# Patient Record
Sex: FEMALE | Race: WHITE | NOT HISPANIC OR LATINO
[De-identification: names, ages, dates, MRNs, and addresses within clinical notes are randomized per-mention and may not be internally consistent; named-entity substitution may affect disease eponyms.]

---

## 2017-12-01 PROBLEM — Z00.00 ENCOUNTER FOR PREVENTIVE HEALTH EXAMINATION: Status: ACTIVE | Noted: 2017-12-01

## 2018-01-05 ENCOUNTER — RECORD ABSTRACTING (OUTPATIENT)
Age: 68
End: 2018-01-05

## 2018-01-05 DIAGNOSIS — R25.2 CRAMP AND SPASM: ICD-10-CM

## 2018-01-05 DIAGNOSIS — Z78.9 OTHER SPECIFIED HEALTH STATUS: ICD-10-CM

## 2018-01-05 DIAGNOSIS — Z83.49 FAMILY HISTORY OF OTHER ENDOCRINE, NUTRITIONAL AND METABOLIC DISEASES: ICD-10-CM

## 2018-01-05 DIAGNOSIS — Z87.19 PERSONAL HISTORY OF OTHER DISEASES OF THE DIGESTIVE SYSTEM: ICD-10-CM

## 2018-01-05 DIAGNOSIS — R29.898 OTHER SYMPTOMS AND SIGNS INVOLVING THE MUSCULOSKELETAL SYSTEM: ICD-10-CM

## 2018-01-05 RX ORDER — CHROMIUM 200 MCG
TABLET ORAL
Refills: 0 | Status: ACTIVE | COMMUNITY

## 2018-01-05 RX ORDER — CALCIUM CARBONATE/VITAMIN D3 600 MG-10
TABLET ORAL
Refills: 0 | Status: ACTIVE | COMMUNITY

## 2018-03-13 ENCOUNTER — APPOINTMENT (OUTPATIENT)
Dept: CARDIOLOGY | Facility: CLINIC | Age: 68
End: 2018-03-13

## 2022-06-29 ENCOUNTER — OFFICE (OUTPATIENT)
Dept: URBAN - METROPOLITAN AREA CLINIC 12 | Facility: CLINIC | Age: 72
Setting detail: OPHTHALMOLOGY
End: 2022-06-29
Payer: MEDICARE

## 2022-06-29 DIAGNOSIS — H25.13: ICD-10-CM

## 2022-06-29 DIAGNOSIS — H40.013: ICD-10-CM

## 2022-06-29 PROCEDURE — 99204 OFFICE O/P NEW MOD 45 MIN: CPT | Performed by: OPHTHALMOLOGY

## 2022-06-29 PROCEDURE — 92250 FUNDUS PHOTOGRAPHY W/I&R: CPT | Performed by: OPHTHALMOLOGY

## 2022-06-29 PROCEDURE — 92020 GONIOSCOPY: CPT | Performed by: OPHTHALMOLOGY

## 2022-06-29 ASSESSMENT — REFRACTION_CURRENTRX
OD_AXIS: 159
OS_CYLINDER: -0.25
OS_SPHERE: +1.50
OD_CYLINDER: -0.50
OD_VPRISM_DIRECTION: SV
OS_VPRISM_DIRECTION: SV
OD_CYLINDER: SPH
OD_SPHERE: +1.25
OS_VPRISM_DIRECTION: SV
OS_CYLINDER: SPH
OS_AXIS: 146
OD_VPRISM_DIRECTION: SV
OS_OVR_VA: 20/
OD_SPHERE: +4.00
OS_SPHERE: +4.00
OD_OVR_VA: 20/
OS_OVR_VA: 20/
OD_OVR_VA: 20/

## 2022-06-29 ASSESSMENT — REFRACTION_AUTOREFRACTION
OD_SPHERE: +2.00
OS_SPHERE: +2.00
OS_AXIS: 151
OD_CYLINDER: -0.25
OD_AXIS: 138
OS_CYLINDER: -0.25

## 2022-06-29 ASSESSMENT — REFRACTION_MANIFEST
OD_VA1: 20/50-2
OS_CYLINDER: -0.25
OS_AXIS: 150
OS_SPHERE: +2.00
OD_AXIS: 140
OD_SPHERE: +2.00
OS_VA1: 20/30-2
OD_CYLINDER: -0.25

## 2022-06-29 ASSESSMENT — TONOMETRY: OD_IOP_MMHG: 19

## 2022-06-29 ASSESSMENT — SPHEQUIV_DERIVED
OS_SPHEQUIV: 1.875
OD_SPHEQUIV: 1.875
OD_SPHEQUIV: 1.875
OS_SPHEQUIV: 1.875

## 2022-06-29 ASSESSMENT — VISUAL ACUITY
OS_BCVA: 20/50+2
OD_BCVA: 20/50+2

## 2022-06-29 ASSESSMENT — CONFRONTATIONAL VISUAL FIELD TEST (CVF)
OS_FINDINGS: FULL
OD_FINDINGS: FULL

## 2022-08-15 ENCOUNTER — OFFICE (OUTPATIENT)
Dept: URBAN - METROPOLITAN AREA CLINIC 12 | Facility: CLINIC | Age: 72
Setting detail: OPHTHALMOLOGY
End: 2022-08-15
Payer: MEDICARE

## 2022-08-15 DIAGNOSIS — H25.13: ICD-10-CM

## 2022-08-15 DIAGNOSIS — H25.11: ICD-10-CM

## 2022-08-15 DIAGNOSIS — H25.12: ICD-10-CM

## 2022-08-15 DIAGNOSIS — H40.013: ICD-10-CM

## 2022-08-15 PROCEDURE — 92133 CPTRZD OPH DX IMG PST SGM ON: CPT | Performed by: OPHTHALMOLOGY

## 2022-08-15 PROCEDURE — 99214 OFFICE O/P EST MOD 30 MIN: CPT | Performed by: OPHTHALMOLOGY

## 2022-08-15 PROCEDURE — 92083 EXTENDED VISUAL FIELD XM: CPT | Performed by: OPHTHALMOLOGY

## 2022-08-15 PROCEDURE — 92136 OPHTHALMIC BIOMETRY: CPT | Performed by: OPHTHALMOLOGY

## 2022-08-15 ASSESSMENT — REFRACTION_CURRENTRX
OS_VPRISM_DIRECTION: SV
OS_CYLINDER: -0.25
OD_CYLINDER: SPH
OD_SPHERE: +1.25
OD_VPRISM_DIRECTION: SV
OS_OVR_VA: 20/
OD_CYLINDER: -0.50
OD_VPRISM_DIRECTION: SV
OS_SPHERE: +1.50
OD_OVR_VA: 20/
OD_SPHERE: +4.00
OS_SPHERE: +4.00
OD_AXIS: 159
OS_CYLINDER: SPH
OD_OVR_VA: 20/
OS_VPRISM_DIRECTION: SV
OS_OVR_VA: 20/
OS_AXIS: 146

## 2022-08-15 ASSESSMENT — REFRACTION_MANIFEST
OD_SPHERE: +2.00
OD_CYLINDER: -0.25
OS_CYLINDER: -0.25
OS_SPHERE: +2.00
OD_AXIS: 140
OD_VA1: 20/50-2
OS_AXIS: 150
OS_VA1: 20/30-2

## 2022-08-15 ASSESSMENT — SPHEQUIV_DERIVED
OD_SPHEQUIV: 1.375
OS_SPHEQUIV: 2
OD_SPHEQUIV: 1.875
OS_SPHEQUIV: 1.875

## 2022-08-15 ASSESSMENT — KERATOMETRY
OS_K2POWER_DIOPTERS: 44.00
OS_K1POWER_DIOPTERS: 43.50
OD_K2POWER_DIOPTERS: 44.25
OD_K1POWER_DIOPTERS: 43.75
OD_AXISANGLE_DEGREES: 047
OS_AXISANGLE_DEGREES: 151

## 2022-08-15 ASSESSMENT — TONOMETRY: OS_IOP_MMHG: 18

## 2022-08-15 ASSESSMENT — CONFRONTATIONAL VISUAL FIELD TEST (CVF)
OD_FINDINGS: FULL
OS_FINDINGS: FULL

## 2022-08-15 ASSESSMENT — AXIALLENGTH_DERIVED
OS_AL: 22.7519
OD_AL: 22.8938
OD_AL: 22.7118
OS_AL: 22.7973

## 2022-08-15 ASSESSMENT — VISUAL ACUITY
OS_BCVA: 20/30-2
OD_BCVA: 20/50+2

## 2022-08-15 ASSESSMENT — REFRACTION_AUTOREFRACTION
OD_CYLINDER: -0.25
OD_SPHERE: +1.50
OS_AXIS: 066
OS_CYLINDER: -0.50
OS_SPHERE: +2.25
OD_AXIS: 028

## 2022-08-25 ENCOUNTER — OFFICE (OUTPATIENT)
Dept: URBAN - METROPOLITAN AREA CLINIC 12 | Facility: CLINIC | Age: 72
Setting detail: OPHTHALMOLOGY
End: 2022-08-25
Payer: MEDICARE

## 2022-08-25 DIAGNOSIS — H25.11: ICD-10-CM

## 2022-08-25 PROCEDURE — 99211 OFF/OP EST MAY X REQ PHY/QHP: CPT | Performed by: OPHTHALMOLOGY

## 2022-08-25 ASSESSMENT — KERATOMETRY
OS_K1POWER_DIOPTERS: 43.50
OD_K1POWER_DIOPTERS: 43.75
OD_K2POWER_DIOPTERS: 44.25
OS_AXISANGLE_DEGREES: 151
OS_K2POWER_DIOPTERS: 44.00
OD_AXISANGLE_DEGREES: 047

## 2022-08-25 ASSESSMENT — REFRACTION_MANIFEST
OD_CYLINDER: -0.25
OD_SPHERE: +2.00
OS_AXIS: 150
OD_VA1: 20/50-2
OS_CYLINDER: -0.25
OD_AXIS: 140
OS_SPHERE: +2.00
OS_VA1: 20/30-2

## 2022-08-25 ASSESSMENT — REFRACTION_CURRENTRX
OD_SPHERE: +4.00
OS_OVR_VA: 20/
OS_SPHERE: +1.50
OD_OVR_VA: 20/
OD_AXIS: 159
OD_VPRISM_DIRECTION: SV
OS_SPHERE: +4.00
OD_OVR_VA: 20/
OD_SPHERE: +1.25
OS_AXIS: 146
OS_VPRISM_DIRECTION: SV
OD_CYLINDER: SPH
OS_VPRISM_DIRECTION: SV
OD_VPRISM_DIRECTION: SV
OS_OVR_VA: 20/
OS_CYLINDER: -0.25
OS_CYLINDER: SPH
OD_CYLINDER: -0.50

## 2022-08-25 ASSESSMENT — REFRACTION_AUTOREFRACTION
OD_CYLINDER: -0.25
OS_AXIS: 066
OS_SPHERE: +2.25
OD_SPHERE: +1.50
OS_CYLINDER: -0.50
OD_AXIS: 028

## 2022-08-25 ASSESSMENT — VISUAL ACUITY
OS_BCVA: 20/30-2
OD_BCVA: 20/50+2

## 2022-08-25 ASSESSMENT — AXIALLENGTH_DERIVED
OS_AL: 22.7519
OS_AL: 22.7973
OD_AL: 22.8938
OD_AL: 22.7118

## 2022-08-25 ASSESSMENT — SPHEQUIV_DERIVED
OD_SPHEQUIV: 1.375
OS_SPHEQUIV: 2
OS_SPHEQUIV: 1.875
OD_SPHEQUIV: 1.875

## 2022-08-29 ENCOUNTER — AMBULATORY SURGERY CENTER (OUTPATIENT)
Dept: URBAN - METROPOLITAN AREA SURGERY 27 | Facility: SURGERY | Age: 72
Setting detail: OPHTHALMOLOGY
End: 2022-08-29
Payer: MEDICARE

## 2022-08-29 DIAGNOSIS — H52.211: ICD-10-CM

## 2022-08-29 DIAGNOSIS — H25.11: ICD-10-CM

## 2022-08-29 PROCEDURE — A9270 NON-COVERED ITEM OR SERVICE: HCPCS | Performed by: OPHTHALMOLOGY

## 2022-08-29 PROCEDURE — 68841 INSJ RX ELUT IMPLT LAC CANAL: CPT | Performed by: OPHTHALMOLOGY

## 2022-08-29 PROCEDURE — FEMTO CATARACT LASER: Performed by: OPHTHALMOLOGY

## 2022-08-29 PROCEDURE — 66984 XCAPSL CTRC RMVL W/O ECP: CPT | Performed by: OPHTHALMOLOGY

## 2022-08-30 ENCOUNTER — RX ONLY (RX ONLY)
Age: 72
End: 2022-08-30

## 2022-08-30 ENCOUNTER — OFFICE (OUTPATIENT)
Dept: URBAN - METROPOLITAN AREA CLINIC 12 | Facility: CLINIC | Age: 72
Setting detail: OPHTHALMOLOGY
End: 2022-08-30
Payer: MEDICARE

## 2022-08-30 DIAGNOSIS — H25.12: ICD-10-CM

## 2022-08-30 DIAGNOSIS — Z96.1: ICD-10-CM

## 2022-08-30 PROCEDURE — 99024 POSTOP FOLLOW-UP VISIT: CPT | Performed by: OPTOMETRIST

## 2022-08-30 ASSESSMENT — REFRACTION_CURRENTRX
OS_CYLINDER: -0.25
OD_OVR_VA: 20/
OS_VPRISM_DIRECTION: SV
OD_VPRISM_DIRECTION: SV
OD_OVR_VA: 20/
OD_VPRISM_DIRECTION: SV
OS_SPHERE: +4.00
OS_OVR_VA: 20/
OS_SPHERE: +1.50
OS_VPRISM_DIRECTION: SV
OS_CYLINDER: SPH
OS_AXIS: 146
OD_CYLINDER: -0.50
OD_AXIS: 159
OD_SPHERE: +4.00
OD_CYLINDER: SPH
OD_SPHERE: +1.25
OS_OVR_VA: 20/

## 2022-08-30 ASSESSMENT — REFRACTION_MANIFEST
OD_SPHERE: +2.00
OD_AXIS: 140
OS_CYLINDER: -0.25
OS_AXIS: 150
OD_CYLINDER: -0.25
OS_SPHERE: +2.00
OD_VA1: 20/50-2
OS_VA1: 20/30-2

## 2022-08-30 ASSESSMENT — TONOMETRY
OD_IOP_MMHG: 20
OS_IOP_MMHG: 18

## 2022-08-30 ASSESSMENT — REFRACTION_AUTOREFRACTION
OS_AXIS: 064
OS_SPHERE: +2.00
OD_CYLINDER: -0.25
OD_AXIS: 125
OD_SPHERE: -0.25
OS_CYLINDER: -0.25

## 2022-08-30 ASSESSMENT — SPHEQUIV_DERIVED
OS_SPHEQUIV: 1.875
OD_SPHEQUIV: -0.375
OS_SPHEQUIV: 1.875
OD_SPHEQUIV: 1.875

## 2022-08-30 ASSESSMENT — VISUAL ACUITY
OS_BCVA: 20/20
OD_BCVA: 20/30-2

## 2022-08-30 ASSESSMENT — KERATOMETRY
OD_K1POWER_DIOPTERS: 43.75
OD_AXISANGLE_DEGREES: 090
OD_K2POWER_DIOPTERS: 43.75
OS_K1POWER_DIOPTERS: 43.50
OS_K2POWER_DIOPTERS: 44.25
OS_AXISANGLE_DEGREES: 129

## 2022-08-30 ASSESSMENT — AXIALLENGTH_DERIVED
OS_AL: 22.7545
OS_AL: 22.7545
OD_AL: 22.7973
OD_AL: 23.6463

## 2022-09-08 ENCOUNTER — OFFICE (OUTPATIENT)
Dept: URBAN - METROPOLITAN AREA CLINIC 12 | Facility: CLINIC | Age: 72
Setting detail: OPHTHALMOLOGY
End: 2022-09-08
Payer: MEDICARE

## 2022-09-08 DIAGNOSIS — Z20.822: ICD-10-CM

## 2022-09-08 DIAGNOSIS — Z01.812: ICD-10-CM

## 2022-09-08 DIAGNOSIS — H25.12: ICD-10-CM

## 2022-09-08 PROCEDURE — 92136 OPHTHALMIC BIOMETRY: CPT | Performed by: OPHTHALMOLOGY

## 2022-09-08 PROCEDURE — 99211 OFF/OP EST MAY X REQ PHY/QHP: CPT | Performed by: OPHTHALMOLOGY

## 2022-09-08 ASSESSMENT — REFRACTION_CURRENTRX
OS_AXIS: 146
OS_VPRISM_DIRECTION: SV
OD_SPHERE: +1.25
OS_SPHERE: +4.00
OS_VPRISM_DIRECTION: SV
OD_CYLINDER: -0.50
OS_CYLINDER: SPH
OS_SPHERE: +1.50
OD_SPHERE: +1.25
OD_AXIS: 159
OD_SPHERE: +4.00
OS_CYLINDER: -0.25
OS_VPRISM_DIRECTION: SV
OD_OVR_VA: 20/
OS_AXIS: 146
OS_OVR_VA: 20/
OD_CYLINDER: SPH
OD_CYLINDER: SPH
OD_OVR_VA: 20/
OS_SPHERE: +4.00
OD_CYLINDER: -0.50
OD_OVR_VA: 20/
OS_OVR_VA: 20/
OD_SPHERE: +4.00
OD_OVR_VA: 20/
OD_VPRISM_DIRECTION: SV
OS_OVR_VA: 20/
OD_AXIS: 159
OS_CYLINDER: -0.25
OS_SPHERE: +1.50
OD_VPRISM_DIRECTION: SV
OS_VPRISM_DIRECTION: SV
OS_OVR_VA: 20/
OS_CYLINDER: SPH

## 2022-09-08 ASSESSMENT — REFRACTION_AUTOREFRACTION
OD_CYLINDER: -0.25
OS_SPHERE: +2.00
OS_CYLINDER: -0.25
OD_SPHERE: -0.25
OS_SPHERE: +1.75
OS_CYLINDER: -0.25
OS_AXIS: 064
OD_AXIS: 115
OD_CYLINDER: -0.75
OD_AXIS: 125
OS_AXIS: 028
OD_SPHERE: -0.25

## 2022-09-08 ASSESSMENT — KERATOMETRY
OD_AXISANGLE_DEGREES: 049
OD_AXISANGLE_DEGREES: 090
OS_AXISANGLE_DEGREES: 129
OD_K2POWER_DIOPTERS: 44.00
OS_K2POWER_DIOPTERS: 44.25
OD_K1POWER_DIOPTERS: 43.75
OS_K1POWER_DIOPTERS: 43.75
OD_K2POWER_DIOPTERS: 43.75
OS_AXISANGLE_DEGREES: 129
OS_K2POWER_DIOPTERS: 44.25
OS_K1POWER_DIOPTERS: 43.50
OD_K1POWER_DIOPTERS: 43.75

## 2022-09-08 ASSESSMENT — SPHEQUIV_DERIVED
OD_SPHEQUIV: 1.875
OD_SPHEQUIV: -0.625
OD_SPHEQUIV: -0.375
OD_SPHEQUIV: 1.875
OS_SPHEQUIV: 1.875
OS_SPHEQUIV: 1.875
OS_SPHEQUIV: 1.625
OS_SPHEQUIV: 1.875

## 2022-09-08 ASSESSMENT — REFRACTION_MANIFEST
OD_AXIS: 140
OD_VA1: 20/50-2
OD_CYLINDER: -0.25
OS_AXIS: 150
OS_CYLINDER: -0.25
OD_SPHERE: +2.00
OD_CYLINDER: -0.25
OS_VA1: 20/30-2
OD_AXIS: 140
OD_SPHERE: +2.00
OS_CYLINDER: -0.25
OS_SPHERE: +2.00
OS_SPHERE: +2.00
OS_VA1: 20/30-2
OS_AXIS: 150
OD_VA1: 20/50-2

## 2022-09-08 ASSESSMENT — VISUAL ACUITY
OS_BCVA: 20/20
OD_BCVA: 20/30-2
OS_BCVA: 20/20-2
OD_BCVA: 20/30+2

## 2022-09-08 ASSESSMENT — AXIALLENGTH_DERIVED
OS_AL: 22.7545
OS_AL: 22.8025
OD_AL: 22.7973
OD_AL: 23.6981
OD_AL: 23.6463
OS_AL: 22.7118
OD_AL: 22.7545
OS_AL: 22.7545

## 2022-09-08 ASSESSMENT — CONFRONTATIONAL VISUAL FIELD TEST (CVF)
OS_FINDINGS: FULL
OD_FINDINGS: FULL

## 2022-09-08 ASSESSMENT — TONOMETRY: OS_IOP_MMHG: 20

## 2022-09-12 ENCOUNTER — AMBULATORY SURGERY CENTER (OUTPATIENT)
Dept: URBAN - METROPOLITAN AREA SURGERY 27 | Facility: SURGERY | Age: 72
Setting detail: OPHTHALMOLOGY
End: 2022-09-12
Payer: MEDICARE

## 2022-09-12 DIAGNOSIS — H52.212: ICD-10-CM

## 2022-09-12 DIAGNOSIS — H25.12: ICD-10-CM

## 2022-09-12 PROCEDURE — FEMTO CATARACT LASER: Performed by: OPHTHALMOLOGY

## 2022-09-12 PROCEDURE — 66984 XCAPSL CTRC RMVL W/O ECP: CPT | Performed by: OPHTHALMOLOGY

## 2022-09-12 PROCEDURE — 68841 INSJ RX ELUT IMPLT LAC CANAL: CPT | Performed by: OPHTHALMOLOGY

## 2022-09-12 PROCEDURE — A9270 NON-COVERED ITEM OR SERVICE: HCPCS | Performed by: OPHTHALMOLOGY

## 2022-09-13 ENCOUNTER — RX ONLY (RX ONLY)
Age: 72
End: 2022-09-13

## 2022-09-13 ENCOUNTER — OFFICE (OUTPATIENT)
Dept: URBAN - METROPOLITAN AREA CLINIC 12 | Facility: CLINIC | Age: 72
Setting detail: OPHTHALMOLOGY
End: 2022-09-13
Payer: MEDICARE

## 2022-09-13 DIAGNOSIS — Z96.1: ICD-10-CM

## 2022-09-13 PROCEDURE — 99024 POSTOP FOLLOW-UP VISIT: CPT | Performed by: OPTOMETRIST

## 2022-09-13 ASSESSMENT — REFRACTION_AUTOREFRACTION
OS_AXIS: 081
OD_CYLINDER: -0.50
OD_AXIS: 110
OS_SPHERE: -0.25
OD_SPHERE: -0.25
OS_CYLINDER: -0.25

## 2022-09-13 ASSESSMENT — REFRACTION_CURRENTRX
OS_AXIS: 146
OD_CYLINDER: -0.50
OD_SPHERE: +4.00
OS_CYLINDER: -0.25
OS_VPRISM_DIRECTION: SV
OS_CYLINDER: SPH
OS_SPHERE: +4.00
OS_SPHERE: +1.50
OD_VPRISM_DIRECTION: SV
OD_CYLINDER: SPH
OD_AXIS: 159
OD_VPRISM_DIRECTION: SV
OS_OVR_VA: 20/
OS_OVR_VA: 20/
OS_VPRISM_DIRECTION: SV
OD_SPHERE: +1.25
OD_OVR_VA: 20/
OD_OVR_VA: 20/

## 2022-09-13 ASSESSMENT — CONFRONTATIONAL VISUAL FIELD TEST (CVF)
OD_FINDINGS: FULL
OS_FINDINGS: FULL

## 2022-09-13 ASSESSMENT — KERATOMETRY
OS_K1POWER_DIOPTERS: 43.75
OD_AXISANGLE_DEGREES: 053
OS_K2POWER_DIOPTERS: 44.00
OS_AXISANGLE_DEGREES: 162
OD_K1POWER_DIOPTERS: 43.75
OD_K2POWER_DIOPTERS: 44.00

## 2022-09-13 ASSESSMENT — REFRACTION_MANIFEST
OD_SPHERE: +2.00
OD_AXIS: 140
OS_SPHERE: +2.00
OS_AXIS: 150
OD_VA1: 20/50-2
OS_CYLINDER: -0.25
OS_VA1: 20/30-2
OD_CYLINDER: -0.25

## 2022-09-13 ASSESSMENT — TONOMETRY
OS_IOP_MMHG: 20
OD_IOP_MMHG: 19

## 2022-09-13 ASSESSMENT — VISUAL ACUITY
OS_BCVA: 20/20-
OD_BCVA: 20/25-2

## 2022-09-13 ASSESSMENT — SPHEQUIV_DERIVED
OS_SPHEQUIV: -0.375
OS_SPHEQUIV: 1.875
OD_SPHEQUIV: -0.5
OD_SPHEQUIV: 1.875

## 2022-09-13 ASSESSMENT — AXIALLENGTH_DERIVED
OS_AL: 22.7545
OS_AL: 23.6003
OD_AL: 22.7545
OD_AL: 23.6491

## 2022-09-20 ENCOUNTER — OFFICE (OUTPATIENT)
Dept: URBAN - METROPOLITAN AREA CLINIC 12 | Facility: CLINIC | Age: 72
Setting detail: OPHTHALMOLOGY
End: 2022-09-20
Payer: MEDICARE

## 2022-09-20 DIAGNOSIS — Z96.1: ICD-10-CM

## 2022-09-20 PROCEDURE — 99024 POSTOP FOLLOW-UP VISIT: CPT | Performed by: OPTOMETRIST

## 2022-09-20 ASSESSMENT — CONFRONTATIONAL VISUAL FIELD TEST (CVF)
OS_FINDINGS: FULL
OD_FINDINGS: FULL

## 2022-09-20 ASSESSMENT — REFRACTION_CURRENTRX
OS_CYLINDER: -0.25
OS_OVR_VA: 20/
OD_VPRISM_DIRECTION: SV
OS_VPRISM_DIRECTION: SV
OD_AXIS: 159
OD_SPHERE: +4.00
OS_SPHERE: +1.50
OD_VPRISM_DIRECTION: SV
OD_OVR_VA: 20/
OD_CYLINDER: -0.50
OS_AXIS: 146
OS_VPRISM_DIRECTION: SV
OD_SPHERE: +1.25
OD_CYLINDER: SPH
OS_SPHERE: +4.00
OS_OVR_VA: 20/
OS_CYLINDER: SPH
OD_OVR_VA: 20/

## 2022-09-20 ASSESSMENT — KERATOMETRY
OD_AXISANGLE_DEGREES: 045
OD_K2POWER_DIOPTERS: 44.00
OS_AXISANGLE_DEGREES: 122
OD_K1POWER_DIOPTERS: 43.75
OS_K1POWER_DIOPTERS: 43.50
OS_K2POWER_DIOPTERS: 44.00

## 2022-09-20 ASSESSMENT — REFRACTION_AUTOREFRACTION
OS_AXIS: 049
OS_SPHERE: PLANO
OD_AXIS: 127
OD_SPHERE: -0.25
OS_CYLINDER: -0.50
OD_CYLINDER: -0.50

## 2022-09-20 ASSESSMENT — AXIALLENGTH_DERIVED
OD_AL: 23.6491
OD_AL: 22.7545
OS_AL: 22.7973

## 2022-09-20 ASSESSMENT — REFRACTION_MANIFEST
OS_AXIS: 150
OS_VA1: 20/30-2
OS_CYLINDER: -0.25
OD_SPHERE: +2.00
OD_CYLINDER: -0.25
OS_SPHERE: +2.00
OD_AXIS: 140
OD_VA1: 20/50-2

## 2022-09-20 ASSESSMENT — SPHEQUIV_DERIVED
OD_SPHEQUIV: -0.5
OS_SPHEQUIV: 1.875
OD_SPHEQUIV: 1.875

## 2022-09-20 ASSESSMENT — TONOMETRY
OD_IOP_MMHG: 20
OS_IOP_MMHG: 21

## 2022-09-20 ASSESSMENT — VISUAL ACUITY
OD_BCVA: 20/20
OS_BCVA: 20/20

## 2022-10-24 ENCOUNTER — RX ONLY (RX ONLY)
Age: 72
End: 2022-10-24

## 2022-10-24 ENCOUNTER — OFFICE (OUTPATIENT)
Dept: URBAN - METROPOLITAN AREA CLINIC 12 | Facility: CLINIC | Age: 72
Setting detail: OPHTHALMOLOGY
End: 2022-10-24
Payer: MEDICARE

## 2022-10-24 DIAGNOSIS — Z96.1: ICD-10-CM

## 2022-10-24 DIAGNOSIS — H16.223: ICD-10-CM

## 2022-10-24 PROBLEM — H40.013 GLAUCOMA SUSPECT, LOW RISK 1-2 FACTORS; BOTH EYES: Status: ACTIVE | Noted: 2022-06-29

## 2022-10-24 PROCEDURE — 99213 OFFICE O/P EST LOW 20 MIN: CPT | Performed by: OPHTHALMOLOGY

## 2022-10-24 ASSESSMENT — CONFRONTATIONAL VISUAL FIELD TEST (CVF)
OD_FINDINGS: FULL
OS_FINDINGS: FULL

## 2022-10-24 ASSESSMENT — REFRACTION_MANIFEST
OS_VA1: 20/30-2
OS_CYLINDER: -0.25
OD_AXIS: 140
OD_VA1: 20/50-2
OS_AXIS: 150
OS_SPHERE: +2.00
OD_CYLINDER: -0.25
OD_SPHERE: +2.00

## 2022-10-24 ASSESSMENT — SPHEQUIV_DERIVED
OS_SPHEQUIV: 1.875
OD_SPHEQUIV: 1.875
OD_SPHEQUIV: -0.625

## 2022-10-24 ASSESSMENT — REFRACTION_CURRENTRX
OS_SPHERE: +4.00
OS_OVR_VA: 20/
OS_VPRISM_DIRECTION: SV
OS_SPHERE: +1.50
OD_AXIS: 159
OD_CYLINDER: SPH
OS_AXIS: 146
OD_VPRISM_DIRECTION: SV
OD_VPRISM_DIRECTION: SV
OD_CYLINDER: -0.50
OS_OVR_VA: 20/
OD_SPHERE: +1.25
OD_OVR_VA: 20/
OD_SPHERE: +4.00
OS_CYLINDER: -0.25
OS_CYLINDER: SPH
OD_OVR_VA: 20/
OS_VPRISM_DIRECTION: SV

## 2022-10-24 ASSESSMENT — REFRACTION_AUTOREFRACTION
OD_CYLINDER: -0.75
OD_SPHERE: -0.25
OD_AXIS: 103
OS_CYLINDER: -0.75
OS_AXIS: 075
OS_SPHERE: PLANO

## 2022-10-24 ASSESSMENT — TONOMETRY
OD_IOP_MMHG: 18
OS_IOP_MMHG: 18

## 2022-10-24 ASSESSMENT — VISUAL ACUITY
OD_BCVA: 20/20-2
OS_BCVA: 20/20

## 2022-10-24 ASSESSMENT — KERATOMETRY
OD_K1POWER_DIOPTERS: 44.00
OS_K2POWER_DIOPTERS: 44.25
OD_AXISANGLE_DEGREES: 090
OS_AXISANGLE_DEGREES: 143
OD_K2POWER_DIOPTERS: 44.00
OS_K1POWER_DIOPTERS: 43.75

## 2022-10-24 ASSESSMENT — AXIALLENGTH_DERIVED
OD_AL: 22.7118
OD_AL: 23.6519
OS_AL: 22.7118

## 2022-10-24 ASSESSMENT — SUPERFICIAL PUNCTATE KERATITIS (SPK)
OD_SPK: 1+
OS_SPK: 1+

## 2023-02-14 ENCOUNTER — APPOINTMENT (OUTPATIENT)
Dept: CARDIOLOGY | Facility: CLINIC | Age: 73
End: 2023-02-14
Payer: MEDICARE

## 2023-02-14 VITALS
DIASTOLIC BLOOD PRESSURE: 80 MMHG | SYSTOLIC BLOOD PRESSURE: 130 MMHG | HEIGHT: 64 IN | HEART RATE: 72 BPM | BODY MASS INDEX: 29.19 KG/M2 | OXYGEN SATURATION: 92 % | RESPIRATION RATE: 16 BRPM | WEIGHT: 171 LBS

## 2023-02-14 DIAGNOSIS — Z87.891 PERSONAL HISTORY OF NICOTINE DEPENDENCE: ICD-10-CM

## 2023-02-14 DIAGNOSIS — Z78.9 OTHER SPECIFIED HEALTH STATUS: ICD-10-CM

## 2023-02-14 DIAGNOSIS — Z82.49 FAMILY HISTORY OF ISCHEMIC HEART DISEASE AND OTHER DISEASES OF THE CIRCULATORY SYSTEM: ICD-10-CM

## 2023-02-14 PROCEDURE — 93000 ELECTROCARDIOGRAM COMPLETE: CPT

## 2023-02-14 PROCEDURE — 99204 OFFICE O/P NEW MOD 45 MIN: CPT

## 2023-02-14 RX ORDER — UBIDECARENONE 100 MG
CAPSULE ORAL
Refills: 0 | Status: DISCONTINUED | COMMUNITY
End: 2023-02-14

## 2023-02-14 RX ORDER — MINOXIDIL 2.5 MG/1
2.5 TABLET ORAL
Qty: 180 | Refills: 3 | Status: ACTIVE | COMMUNITY

## 2023-02-14 RX ORDER — VALSARTAN AND HYDROCHLOROTHIAZIDE 80; 12.5 MG/1; MG/1
80-12.5 TABLET, FILM COATED ORAL
Refills: 0 | Status: DISCONTINUED | COMMUNITY
End: 2023-02-14

## 2023-02-14 NOTE — REASON FOR VISIT
[FreeTextEntry1] : 72-year-old female last seen here in 2017 presenting for reevaluation with concerns about palpitations.\par \par History of ; \par -hyperlipidemia\par -Hypertension\par -mitral valve insufficiency.\par \par

## 2023-02-14 NOTE — ASSESSMENT
[FreeTextEntry1] : ECG: Normal sinus rhythm at 72.  Left anterior fascicular block poor progression with diffuse nonspecific T wave normalities.\par \par Laboratory data:\par ----10/26/2022\par Chol---175\par HDL-----79\par LDL-----83\par Trigs----67\par \par Impression:\par 1.  Palpitations, likely reflecting reflex tachycardia from minoxidil.\par      Occasional irregularity is also noted on examination.\par \par 2.  History of mild to moderate mitral valve insufficiency with a recent echocardiography performed through PMD.  No record available for review at this time.\par \par 3.  Hyperlipidemia seemingly well controlled on current Lescol regimen.\par \par 4.  History of hypertension seemingly resolved.  Valsartan was discontinued previous\par      Minoxidil is for alopecia\par      taking hydrochlorothiazide for minoxidil related edema.\par \par Plan:\par 1.  We will obtain 24-hour Holter monitoring to assess the tachycardia/irregularities.\par \par 2.  We will obtain report of recent echocardiogram.\par \par 3.  We will regroup to discuss Holter monitoring and the role of beta-blocker therapy if necessary to facilitate use of minoxidil.\par \par 4.  Consideration of calcium scoring CT.

## 2023-02-14 NOTE — PHYSICAL EXAM
[de-identified] :                    Well appearing and nourished with no obvious deformities or distress.\par \par Eyes: \par No conjunctival injection and no xanthelasmas.\par HEENT: \par Normocephalic.Normal oral mucosa. No pallor or cyanosis\par Neck: \par No jugular venous distension. with normal A and V wave forms. No palpable adenopathy.\par Cardiovascular: \par Normal rate and rhythm with normal S1, S2 and a grade 1/6 systolic murmur. Distal arterial pulses are normal. No significant peripheral edema.\par Pulmonary: \par Lungs are clear to auscultation and percussion. Normal respiratory pattern without any accessory muscle use\par Abdomen: \par Soft, non-tender ; no palpable organomegaly or masses.\par Extremities:\par No digital clubbing, cyanosis or ischemic changes.\par Skin: \par No skin lesions, rashes, ulcers or xanthomas.\par Psychiatric: \par Alert and oriented to person, place and time. Appropriate mood and affect.

## 2023-02-14 NOTE — HISTORY OF PRESENT ILLNESS
[FreeTextEntry1] : Patient describes 3 to 4 days of increased palpitations, intermittent and describes a rapid pounding sensation that seems unprovoked and rapid at times.  There are no other associated symptoms.\par \par She exercises regularly walking 20 to 30 minutes a day without any shortness of breath or chest pain.  In general, does not have any significant palpitations.\par Home blood pressure runs 117-120 systolic\par \par Patient does not drink any caffeinated beverages\par \par Valvular disease is being followed through primary care physician's office and echo was just done but the report is not available.\par \par CRF's:\par +  family history of coronary disease mother bypassed on 2 separate occasions in her 70s.\par - Hypertension has been better controlled and she in fact was able to stop her valsartan HCT because of the good    control.  Hydrochlorothiazide was only now just restarted because of swelling on minoxidil.\par - Denies diabetes.\par - Hyperlipidemia is well managed.\par - No cigarettes in over 40 years.

## 2023-02-17 ENCOUNTER — APPOINTMENT (OUTPATIENT)
Dept: CARDIOLOGY | Facility: CLINIC | Age: 73
End: 2023-02-17
Payer: MEDICARE

## 2023-02-17 VITALS
SYSTOLIC BLOOD PRESSURE: 142 MMHG | RESPIRATION RATE: 16 BRPM | BODY MASS INDEX: 29.53 KG/M2 | HEIGHT: 64 IN | HEART RATE: 84 BPM | DIASTOLIC BLOOD PRESSURE: 90 MMHG | WEIGHT: 173 LBS | OXYGEN SATURATION: 96 %

## 2023-02-17 PROCEDURE — 93000 ELECTROCARDIOGRAM COMPLETE: CPT | Mod: 59

## 2023-02-17 PROCEDURE — 99214 OFFICE O/P EST MOD 30 MIN: CPT

## 2023-02-17 NOTE — ASSESSMENT
[FreeTextEntry1] : ECG: Normal sinus rhythm at 72.  Left anterior fascicular block poor progression with diffuse nonspecific T wave normalities.\par \par Laboratory data:\par ----10/26/2022\par Chol---175\par HDL-----79\par LDL-----83\par Trigs----67\par \par 24-hour Holter monitor sues 2/14/2023:\par Baseline rhythm predominantly A-fib with conversions into sinus rhythm.\par Average heart rate 85 bpm\par Range 58 to 161 bpm\par Rare PVCs\par Patient events always correlated with A-fib.\par \par Impression:\par 1.  Palpitations, consistent with paroxysmal atrial fibrillation.\par \par 2.  History of mild to moderate mitral valve insufficiency with a recent echocardiography performed through PMD.  Reportedly showing only mild MR\par \par 3.  Hyperlipidemia seemingly well controlled on current Lescol regimen.\par \par 4.  History of hypertension seemingly resolved.  Valsartan was discontinued previous\par      Minoxidil is for alopecia\par      taking hydrochlorothiazide for minoxidil related edema.\par \par Plan:\par 1.  Begin Toprol-XL 25 mg p.o. daily.\par      Discontinue hydrochlorothiazide.\par \par 2.  Begin Eliquis 5 mg p.o. twice daily.\par \par 3.  Clinical follow-up regarding the above in 1 month.\par \par 4.  Consideration of calcium scoring CT.

## 2023-02-17 NOTE — REASON FOR VISIT
[FreeTextEntry1] : 72-year-old female presenting for reevaluation with concerns about palpitations \par Patient had 3 to 4 days of increased palpitations, intermittent and describes a rapid pounding sensation that seems unprovoked and rapid at times.  There are no other associated symptoms.  Onset of symptoms was shortly after beginning low-dose minoxidil for alopecia.\par A Holter monitor was obtained and will be reviewed today.\par She actually feels that there has been a break in the palpitations over the last 36 hours.\par \par History of ; \par -hyperlipidemia\par -Hypertension\par -mitral valve insufficiency.\par \par

## 2023-02-17 NOTE — HISTORY OF PRESENT ILLNESS
[FreeTextEntry1] : She exercises regularly walking 20 to 30 minutes a day without any shortness of breath or chest pain.  In general, does not have any significant palpitations.\par Home blood pressure runs 117-120 systolic\par \par Patient does not drink any caffeinated beverages\par \par Valvular disease is being followed through primary care physician's office and echo was just done showing normal LV function only mild mitral regurgitation\par \par CRF's:\par +  family history of coronary disease mother bypassed on 2 separate occasions in her 70s.\par - Hypertension has been better controlled and she in fact was able to stop her valsartan HCT because of the good    control.  Hydrochlorothiazide was only now just restarted because of swelling on minoxidil.\par - Denies diabetes.\par - Hyperlipidemia is well managed.\par - No cigarettes in over 40 years.

## 2023-02-17 NOTE — PHYSICAL EXAM
[de-identified] :                    Well appearing and nourished with no obvious deformities or distress.\par \par Eyes: \par No conjunctival injection and no xanthelasmas.\par HEENT: \par Normocephalic.Normal oral mucosa. No pallor or cyanosis\par Neck: \par No jugular venous distension. with normal A and V wave forms. No palpable adenopathy.\par Cardiovascular: \par Normal rate and rhythm with normal S1, S2 and a grade 1/6 systolic murmur. Distal arterial pulses are normal. No significant peripheral edema.\par Pulmonary: \par Lungs are clear to auscultation and percussion. Normal respiratory pattern without any accessory muscle use\par Abdomen: \par Soft, non-tender ; no palpable organomegaly or masses.\par Extremities:\par No digital clubbing, cyanosis or ischemic changes.\par Skin: \par No skin lesions, rashes, ulcers or xanthomas.\par Psychiatric: \par Alert and oriented to person, place and time. Appropriate mood and affect.

## 2023-04-26 ENCOUNTER — APPOINTMENT (OUTPATIENT)
Dept: CARDIOLOGY | Facility: CLINIC | Age: 73
End: 2023-04-26
Payer: MEDICARE

## 2023-04-26 VITALS
HEIGHT: 64 IN | WEIGHT: 173 LBS | HEART RATE: 59 BPM | RESPIRATION RATE: 16 BRPM | OXYGEN SATURATION: 96 % | BODY MASS INDEX: 29.53 KG/M2

## 2023-04-26 PROCEDURE — 99214 OFFICE O/P EST MOD 30 MIN: CPT

## 2023-04-26 PROCEDURE — 93000 ELECTROCARDIOGRAM COMPLETE: CPT

## 2023-04-26 RX ORDER — HYDROCHLOROTHIAZIDE 12.5 MG/1
12.5 TABLET ORAL
Qty: 30 | Refills: 2 | Status: DISCONTINUED | COMMUNITY
End: 2023-04-26

## 2023-04-26 NOTE — PHYSICAL EXAM
[de-identified] :                    Well appearing and nourished with no obvious deformities or distress.\par \par Eyes: \par No conjunctival injection and no xanthelasmas.\par HEENT: \par Normocephalic.Normal oral mucosa. No pallor or cyanosis\par Neck: \par No jugular venous distension. with normal A and V wave forms. No palpable adenopathy.\par Cardiovascular: \par Normal rate and rhythm with normal S1, S2 and a grade 1/6 systolic murmur. Distal arterial pulses are normal. No significant peripheral edema.\par Pulmonary: \par Lungs are clear to auscultation and percussion. Normal respiratory pattern without any accessory muscle use\par Abdomen: \par Soft, non-tender ; no palpable organomegaly or masses.\par Extremities:\par No digital clubbing, cyanosis or ischemic changes.\par Skin: \par No skin lesions, rashes, ulcers or xanthomas.\par Psychiatric: \par Alert and oriented to person, place and time. Appropriate mood and affect.

## 2023-04-26 NOTE — ASSESSMENT
[FreeTextEntry1] : ECG: Normal sinus rhythm at 59.  Left anterior fascicular block poor progression with diffuse nonspecific T wave normalities.\par \par Laboratory data:\par ----10/26/2022\par Chol---175\par HDL-----79\par LDL-----83\par Trigs----67\par \par 24-hour Holter monitor sues 2/14/2023:\par Baseline rhythm predominantly A-fib with conversions into sinus rhythm.\par Average heart rate 85 bpm\par Range 58 to 161 bpm\par Rare PVCs\par Patient events always correlated with A-fib.\par \par Impression:\par 1.  Palpitations, consistent with paroxysmal atrial fibrillation.  Toprol-XL 25 daily being well-tolerated\par \par 2.  History of mild to moderate mitral valve insufficiency with a recent echocardiography performed through PMD.  Reportedly showing only mild MR\par \par 3.  Hyperlipidemia seemingly well controlled on current Lescol regimen.\par \par 4.  History of hypertension seemingly resolved.  Valsartan was discontinued previous\par      Minoxidil is for alopecia\par      \par \par Plan:\par 1.  Continue Toprol-XL 25 mg p.o. daily.\par     \par 2.  Continue Eliquis 5 mg p.o. twice daily.\par \par 3.   Ordering calcium scoring CT. rationale discussed with patient\par \par 4.  Lipid profile in 6 months

## 2023-04-26 NOTE — HISTORY OF PRESENT ILLNESS
[FreeTextEntry1] : She exercises regularly walking 20 to 30 minutes a day without any shortness of breath or chest pain.  In general, does not have any significant palpitations.\par Home blood pressure runs 117-120 systolic\par \par Patient does not drink any caffeinated beverages\par \par Valvular disease was  being followed through primary care physician's office and echo was just done showing normal LV function only mild mitral regurgitation\par \par CRF's:\par +  family history of coronary disease mother bypassed on 2 separate occasions in her 70s.\par + Hypertension has been better controlled and she in fact was able to stop her valsartan HCT because of the good    control.  Hydrochlorothiazide was only now just restarted because of swelling on minoxidil.\par - Denies diabetes.\par - Hyperlipidemia is well managed.\par - No cigarettes in over 40 years.\par \par Dermatology just started spironolactone in an effort to counterbalance the effects of minoxidil on the rest of her body.

## 2023-04-26 NOTE — REASON FOR VISIT
[FreeTextEntry1] : 72-year-old female presenting for reevaluation \par \par History of ; \par -hyperlipidemia\par -Hypertension\par -mitral valve insufficiency.\par - PAF \par \par Patient had 3 to 4 days of increased palpitations, intermittent and describes a rapid pounding sensation that seems unprovoked and rapid at times beginning  after initiating low-dose minoxidil for alopecia.\par Monitoring demonstrated atrial fibrillation.

## 2023-06-22 ENCOUNTER — NON-APPOINTMENT (OUTPATIENT)
Age: 73
End: 2023-06-22

## 2023-08-24 ENCOUNTER — APPOINTMENT (OUTPATIENT)
Dept: CARDIOLOGY | Facility: CLINIC | Age: 73
End: 2023-08-24
Payer: MEDICARE

## 2023-08-24 VITALS
BODY MASS INDEX: 27.14 KG/M2 | HEART RATE: 66 BPM | OXYGEN SATURATION: 98 % | WEIGHT: 159 LBS | RESPIRATION RATE: 16 BRPM | DIASTOLIC BLOOD PRESSURE: 80 MMHG | SYSTOLIC BLOOD PRESSURE: 140 MMHG | HEIGHT: 64 IN

## 2023-08-24 PROCEDURE — 93000 ELECTROCARDIOGRAM COMPLETE: CPT

## 2023-08-24 PROCEDURE — 99214 OFFICE O/P EST MOD 30 MIN: CPT

## 2023-08-24 NOTE — HISTORY OF PRESENT ILLNESS
[FreeTextEntry1] : She exercises regularly walking 20 to 30 minutes a day without any shortness of breath or chest pain.  In general, does not have any significant palpitations. Home blood pressure runs 117-120 systolic  Patient does not drink any caffeinated beverages  Valvular disease was  being followed through primary care physician's office and echo was just done showing normal LV function only mild mitral regurgitation  CRF's: +  family history of coronary disease mother bypassed on 2 separate occasions in her 70s. + Hypertension has been better controlled and she in fact was able to stop her valsartan HCT because of the good    control.  Hydrochlorothiazide was only now just restarted because of swelling on minoxidil. - Denies diabetes. - Hyperlipidemia is well managed. - No cigarettes in over 40 years.  Dermatology just started spironolactone in an effort to counterbalance the effects of minoxidil on the rest of her body.

## 2023-08-24 NOTE — PHYSICAL EXAM
[de-identified] :                    Well appearing and nourished with no obvious deformities or distress.\par  \par  Eyes: \par  No conjunctival injection and no xanthelasmas.\par  HEENT: \par  Normocephalic.Normal oral mucosa. No pallor or cyanosis\par  Neck: \par  No jugular venous distension. with normal A and V wave forms. No palpable adenopathy.\par  Cardiovascular: \par  Normal rate and rhythm with normal S1, S2 and a grade 1/6 systolic murmur. Distal arterial pulses are normal. No significant peripheral edema.\par  Pulmonary: \par  Lungs are clear to auscultation and percussion. Normal respiratory pattern without any accessory muscle use\par  Abdomen: \par  Soft, non-tender ; no palpable organomegaly or masses.\par  Extremities:\par  No digital clubbing, cyanosis or ischemic changes.\par  Skin: \par  No skin lesions, rashes, ulcers or xanthomas.\par  Psychiatric: \par  Alert and oriented to person, place and time. Appropriate mood and affect.

## 2023-08-24 NOTE — ASSESSMENT
[FreeTextEntry1] : ECG: Normal sinus rhythm at 66.  Left anterior fascicular block poor progression with diffuse nonspecific T wave normalities.  Laboratory data: ----10/26/2022 Chol---175 HDL-----79 LDL-----83 Trigs----67  24-hour Holter monitor [facility] 2/14/2023: Baseline rhythm predominantly A-fib with conversions into sinus rhythm. Average heart rate 85 bpm Range 58 to 161 bpm Rare PVCs Patient events always correlated with A-fib.  Impression: 1.  New nocturnal palpitations, possibly representing recurrences of PAF     Cannot exclude the possibility that this is DIAMOND  2.  History of mild to moderate mitral valve insufficiency with a recent echocardiography performed through PMD.         Reportedly showing only mild MR  3.  Hyperlipidemia seemingly well controlled on current Lescol regimen.  4.  History of hypertension seemingly resolved.  Valsartan was discontinued previous      Minoxidil is for alopecia        Plan: 1.  Change Toprol XL to twice daily.      2.  Continue Eliquis 5 mg p.o. twice daily.  3.   Ordering calcium scoring CT. rationale discussed with patient  4.  Home sleep study will be arranged.  5.  Patient to contact with regard to any new adverse symptoms.

## 2023-08-24 NOTE — REASON FOR VISIT
[FreeTextEntry1] : 73-year-old female presenting for urgent reevaluation with concerns about nocturnal palpitations.  History of ;  -hyperlipidemia -Hypertension -mitral valve insufficiency. - PAF   States that she is getting frequent and nearly nightly episodes of rapid forceful heart pounding, awakening her from sleep at 3 4 and 5:00 in the morning.  Sometimes this persists into her morning hours. Uncertain whether this mimics the symptoms that she had previously described that were consistent with atrial fibrillation. She does not have any known diagnosis of sleep apnea but does believe that she has at times stop breathing during her sleep.  Patient had previously presented with 3 to 4 days of increased palpitations, intermittent and describes a rapid pounding sensation that seems unprovoked and rapid at times beginning  after initiating low-dose minoxidil for alopecia. Monitoring demonstrated atrial fibrillation.

## 2023-08-31 ENCOUNTER — OUTPATIENT (OUTPATIENT)
Dept: OUTPATIENT SERVICES | Facility: HOSPITAL | Age: 73
LOS: 1 days | End: 2023-08-31
Payer: MEDICARE

## 2023-08-31 DIAGNOSIS — G47.33 OBSTRUCTIVE SLEEP APNEA (ADULT) (PEDIATRIC): ICD-10-CM

## 2023-08-31 PROCEDURE — G0400: CPT | Mod: 26

## 2023-08-31 PROCEDURE — 95800 SLP STDY UNATTENDED: CPT

## 2023-10-05 ENCOUNTER — APPOINTMENT (OUTPATIENT)
Dept: CARDIOLOGY | Facility: CLINIC | Age: 73
End: 2023-10-05
Payer: MEDICARE

## 2023-10-05 VITALS
HEART RATE: 55 BPM | HEIGHT: 64 IN | RESPIRATION RATE: 16 BRPM | DIASTOLIC BLOOD PRESSURE: 80 MMHG | OXYGEN SATURATION: 91 % | SYSTOLIC BLOOD PRESSURE: 140 MMHG | WEIGHT: 165 LBS | BODY MASS INDEX: 28.17 KG/M2

## 2023-10-05 PROCEDURE — 93000 ELECTROCARDIOGRAM COMPLETE: CPT

## 2023-10-05 PROCEDURE — 99214 OFFICE O/P EST MOD 30 MIN: CPT

## 2024-02-08 ENCOUNTER — APPOINTMENT (OUTPATIENT)
Dept: CARDIOLOGY | Facility: CLINIC | Age: 74
End: 2024-02-08
Payer: MEDICARE

## 2024-02-08 VITALS
BODY MASS INDEX: 29.53 KG/M2 | SYSTOLIC BLOOD PRESSURE: 120 MMHG | DIASTOLIC BLOOD PRESSURE: 80 MMHG | OXYGEN SATURATION: 93 % | HEIGHT: 64 IN | RESPIRATION RATE: 16 BRPM | HEART RATE: 57 BPM | WEIGHT: 173 LBS

## 2024-02-08 DIAGNOSIS — I48.0 PAROXYSMAL ATRIAL FIBRILLATION: ICD-10-CM

## 2024-02-08 DIAGNOSIS — I10 ESSENTIAL (PRIMARY) HYPERTENSION: ICD-10-CM

## 2024-02-08 DIAGNOSIS — E78.5 HYPERLIPIDEMIA, UNSPECIFIED: ICD-10-CM

## 2024-02-08 DIAGNOSIS — I05.9 RHEUMATIC MITRAL VALVE DISEASE, UNSPECIFIED: ICD-10-CM

## 2024-02-08 DIAGNOSIS — R00.2 PALPITATIONS: ICD-10-CM

## 2024-02-08 PROCEDURE — 99214 OFFICE O/P EST MOD 30 MIN: CPT

## 2024-02-08 PROCEDURE — G2211 COMPLEX E/M VISIT ADD ON: CPT

## 2024-02-08 PROCEDURE — 93000 ELECTROCARDIOGRAM COMPLETE: CPT

## 2024-02-08 RX ORDER — APIXABAN 5 MG/1
5 TABLET, FILM COATED ORAL
Qty: 180 | Refills: 3 | Status: ACTIVE | COMMUNITY
Start: 2023-02-17 | End: 1900-01-01

## 2024-02-08 RX ORDER — METOPROLOL SUCCINATE 25 MG/1
25 TABLET, EXTENDED RELEASE ORAL
Qty: 180 | Refills: 3 | Status: ACTIVE | COMMUNITY
Start: 2023-02-17 | End: 1900-01-01

## 2024-02-08 NOTE — HISTORY OF PRESENT ILLNESS
[FreeTextEntry1] : Recently hospitalized in [place] with abdominal pain and diagnosed with a left inguinal hernia.  Surgery is being contemplated in the near future.    She exercises regularly walking 20 to 30 minutes a day without any shortness of breath or chest pain.  In general, does not have any significant palpitations.  Patient does not drink any caffeinated beverages  Valvular disease was being followed through primary care physician's office and echo was just done showing normal LV function only mild mitral regurgitation  CRF's: +  family history of coronary disease mother bypassed on 2 separate occasions in her 70s. + Hypertension has been better controlled and she in fact was able to stop her valsartan HCT because of the good    control.  Hydrochlorothiazide was only now just restarted because of swelling on minoxidil. - Denies diabetes. - Hyperlipidemia is well managed. - No cigarettes in over 40 years.  Dermatology started spironolactone in an effort to counterbalance the effects of minoxidil on the rest of her body.

## 2024-02-08 NOTE — REASON FOR VISIT
[FreeTextEntry1] : 73-year-old female presenting for urgent reevaluation   History of ;  -hyperlipidemia -Hypertension -mitral valve insufficiency. - PAF   She had been getting getting frequent and nearly nightly episodes of rapid forceful heart pounding, awakening her from sleep at 3 4 and 5:00 in the morning.  Uncertain whether this mimics the symptoms that she had previously described that were consistent with atrial fibrillation. Mild obstructive sleep apnea has since been diagnosed but no therapy is in place yet.  Patient had previously presented with 3 to 4 days of increased palpitations, intermittent and describes a rapid pounding sensation that seems unprovoked and rapid at times beginning  after initiating low-dose minoxidil for alopecia. Monitoring demonstrated atrial fibrillation.  After we doubled her metoprolol to twice daily,  palpitations  improved very considerably. Tolerating the increased dosing running a systolic blood pressure in the 130/70 range.

## 2024-02-08 NOTE — PHYSICAL EXAM
[de-identified] :                    Well appearing and nourished with no obvious deformities or distress.  Eyes:  No conjunctival injection and no xanthelasmas. HEENT:  Normocephalic.Normal oral mucosa. No pallor or cyanosis Neck:  No jugular venous distension. with normal A and V wave forms. No palpable adenopathy. Cardiovascular:  Normal rate and rhythm with normal S1, S2 and a grade 1/6 systolic murmur. Distal arterial pulses are normal. No significant peripheral edema. Pulmonary:  Lungs are clear to auscultation and percussion. Normal respiratory pattern without any accessory muscle use Abdomen:  Soft, non-tender ; no palpable organomegaly or masses. Extremities: No digital clubbing, cyanosis or ischemic changes. Skin:  No skin lesions, rashes, ulcers or xanthomas. Psychiatric:  Alert and oriented to person, place and time. Appropriate mood and affect.

## 2024-02-08 NOTE — ASSESSMENT
[FreeTextEntry1] : ECG: Normal sinus rhythm at 57 bpm.  Leftward axis deviation low voltage QRS nonspecific T wave normalities unchanged from prior study.  Laboratory data: ----10/26/22--9/5/23 Chol---175-----177 HDL-----79------77 LDL-----83-------85 Trigs----67-------75  Home sleep study 8/31/2023: AHI-12 Reji O2 72% Supine index 28 Mild obstructive sleep apnea  24-hour Holter monitor sues 2/14/2023: Baseline rhythm predominantly A-fib with conversions into sinus rhythm. Average heart rate 85 bpm Range 58 to 161 bpm Rare PVCs Patient events always correlated with A-fib.  CT calcium score 5/4/2023: Total: 51.5 LMCA: 33 LAD: 13 Circumflex: 0 RCA: 5.6  Impression: 1.  Nocturnal palpitations have improved quite considerably with increased dosing of metoprolol.      2.  History of mild to moderate mitral valve insufficiency with a recent echocardiography performed through PMD.       Reportedly showing only mild MR  3.  Hyperlipidemia seemingly well controlled on current Lescol regimen.      CAC = 54 suggests need for tight control.  4.  History of hypertension with borderline elevation on metoprolol.       5.  Mild obstructive sleep apnea, AHI 12, however, desaturation to 72 is significant.  Substantial positional component noted.  Plan: 1.  Maintain Toprol XL to twice daily.      2.  Continue Eliquis 5 mg p.o. twice daily.  3.   Wedge pillow to keep her on her side when she sleeps.  4.  Further weight loss is encouraged.  Patient understands relationship between weight and sleep apnea and related PAF.  5.  Should patient require herniorrhaphy, there are no cardiac contraindications.       Eliquis would be stopped 48 hours before the procedure and restart postoperatively when surgically cleared        Metoprolol will be taken as per regular schedule with sip of water.

## 2024-02-15 RX ORDER — FLUVASTATIN SODIUM 80 MG/1
80 TABLET, EXTENDED RELEASE ORAL DAILY
Qty: 90 | Refills: 3 | Status: ACTIVE | COMMUNITY
Start: 1900-01-01 | End: 1900-01-01

## 2024-04-08 ENCOUNTER — APPOINTMENT (OUTPATIENT)
Dept: PULMONOLOGY | Facility: CLINIC | Age: 74
End: 2024-04-08
Payer: MEDICARE

## 2024-04-09 ENCOUNTER — APPOINTMENT (OUTPATIENT)
Dept: PULMONOLOGY | Facility: CLINIC | Age: 74
End: 2024-04-09
Payer: MEDICARE

## 2024-04-09 VITALS
DIASTOLIC BLOOD PRESSURE: 68 MMHG | OXYGEN SATURATION: 97 % | HEART RATE: 68 BPM | SYSTOLIC BLOOD PRESSURE: 136 MMHG | HEIGHT: 64 IN | RESPIRATION RATE: 16 BRPM | WEIGHT: 168 LBS | BODY MASS INDEX: 28.68 KG/M2

## 2024-04-09 DIAGNOSIS — K21.9 GASTRO-ESOPHAGEAL REFLUX DISEASE W/OUT ESOPHAGITIS: ICD-10-CM

## 2024-04-09 PROCEDURE — 99204 OFFICE O/P NEW MOD 45 MIN: CPT

## 2024-04-09 NOTE — HISTORY OF PRESENT ILLNESS
[Obstructive Sleep Apnea] : obstructive sleep apnea [Home] : home [Awakes Unrefreshed] : does not awaken unrefreshed [Awakes with Dry Mouth] : awakes with dry mouth [Awakes with Headache] : does not awaken with headache [Difficulty Maintaining Sleep] : difficulty maintaining sleep [Snoring] : snoring [Witnessed Apneas] : witnessed apneas [Unusual Sleep Behavior] : no unusual sleep behavior [TextBox_77] : 1849 [TextBox_79] : 2617 [TextBox_81] : 15 [TextBox_89] : 3-4 [TextBox_93] : GERD [TextBox_100] : 8/31/2023 [TextBox_108] : 12.0 (REM 18.8) [TextBox_112] : 30 minutes [TextBox_116] : 72 [TextBox_120] : Supine index 27.7,  minutes [ESS] : 2

## 2024-04-09 NOTE — DISCUSSION/SUMMARY
[Obstructive Sleep Apnea] : obstructive sleep apnea [Mild] : mild [Alcohol Avoidance] : alcohol avoidance [Sedative Avoidance] : sedative avoidance [Weight Loss Program] : weight loss program [Patient] : discussed with the patient [de-identified] : Moderate and REM and supine [de-identified] : The pathophysiology of sleep was explained to the patient in detail. Inclusive of this was the reasoning behind and the expected response to positive airway pressure therapy. Compliance was outlined including further followup.  CPAP was initiated with full explanation of the physiology behind treatment, compliance requirements and care of equipment.

## 2024-04-09 NOTE — CONSULT LETTER
[Dear  ___] : Dear  [unfilled], [Consult Letter:] : I had the pleasure of evaluating your patient, [unfilled]. [Please see my note below.] : Please see my note below. [Consult Closing:] : Thank you very much for allowing me to participate in the care of this patient.  If you have any questions, please do not hesitate to contact me. [Sincerely,] : Sincerely, [FreeTextEntry3] : Ramone Farmer MD FCCP Pulmonary/Critical Care/Sleep Medicine Department of Internal Medicine  [facility]

## 2024-06-13 ENCOUNTER — APPOINTMENT (OUTPATIENT)
Dept: PULMONOLOGY | Facility: CLINIC | Age: 74
End: 2024-06-13
Payer: MEDICARE

## 2024-06-13 VITALS
HEART RATE: 77 BPM | HEIGHT: 63.5 IN | SYSTOLIC BLOOD PRESSURE: 134 MMHG | DIASTOLIC BLOOD PRESSURE: 68 MMHG | RESPIRATION RATE: 16 BRPM | WEIGHT: 162 LBS | OXYGEN SATURATION: 95 % | BODY MASS INDEX: 28.35 KG/M2

## 2024-06-13 DIAGNOSIS — G47.33 OBSTRUCTIVE SLEEP APNEA (ADULT) (PEDIATRIC): ICD-10-CM

## 2024-06-13 DIAGNOSIS — Z71.89 OTHER SPECIFIED COUNSELING: ICD-10-CM

## 2024-06-13 PROCEDURE — 99213 OFFICE O/P EST LOW 20 MIN: CPT

## 2024-06-13 NOTE — DISCUSSION/SUMMARY
[Obstructive Sleep Apnea] : obstructive sleep apnea [Mild] : mild [Alcohol Avoidance] : alcohol avoidance [Sedative Avoidance] : sedative avoidance [Weight Loss Program] : weight loss program [Patient] : discussed with the patient [Responding to Treatment] : responding to treatment [de-identified] : Moderate and REM and supine [de-identified] : The pathophysiology of sleep was explained to the patient in detail. Inclusive of this was the reasoning behind and the expected response to positive airway pressure therapy. Compliance was outlined including further followup.  CPAP was initiated with full explanation of the physiology behind treatment, compliance requirements and care of equipment. [FreeTextEntry1] : Compliance affected by musculoskeletal disease.  Patient has been counseled on importance of therapy.  Her overall airway pressures are in a range that she may benefit from an oral appliance.

## 2024-06-13 NOTE — CONSULT LETTER
[Dear  ___] : Dear  [unfilled], [Consult Letter:] : I had the pleasure of evaluating your patient, [unfilled]. [Please see my note below.] : Please see my note below. [Consult Closing:] : Thank you very much for allowing me to participate in the care of this patient.  If you have any questions, please do not hesitate to contact me. [Sincerely,] : Sincerely, [FreeTextEntry3] : Ramone Farmer MD FCCP Pulmonary/Critical Care/Sleep Medicine Department of Internal Medicine  [facility]

## 2024-06-13 NOTE — HISTORY OF PRESENT ILLNESS
[Obstructive Sleep Apnea] : obstructive sleep apnea [Home] : home [APAP:] : APAP [Awakes Unrefreshed] : does not awaken unrefreshed [Awakes with Dry Mouth] : does not awaken with dry mouth [Awakes with Headache] : does not awaken with headache [Difficulty Maintaining Sleep] : does not have difficulty maintaining sleep [Snoring] : no snoring [Witnessed Apneas] : no witnessed apneas [Unusual Sleep Behavior] : no unusual sleep behavior [Nasal pillow] : nasal pillow [TextBox_77] : 8945 [TextBox_79] : 1525 [TextBox_81] : 15 [TextBox_89] : 3-4 [TextBox_93] : GERD [TextBox_100] : 8/31/2023 [TextBox_108] : 12.0 (REM 18.8) [TextBox_112] : 30 minutes [TextBox_116] : 72 [TextBox_120] : Supine index 27.7,  minutes [TextBox_125] : 4-16 [TextBox_127] : 5/10/2024 [TextBox_129] : 6/8/2024 [TextBox_133] : 33 [TextBox_137] : 23 [TextBox_141] : 6 [TextBox_143] : 5 [TextBox_147] : 0.7 [TextBox_158] : [place] [TextBox_160] : MARIEL Milton mask [TextBox_162] : 4/2024 [TextBox_165] : P95%: 6.1cm H2O Patient has been poorly compliant secondary to chronic neck pains [ESS] : 2

## 2024-07-11 ENCOUNTER — APPOINTMENT (OUTPATIENT)
Dept: CARDIOLOGY | Facility: CLINIC | Age: 74
End: 2024-07-11
Payer: MEDICARE

## 2024-07-11 VITALS
OXYGEN SATURATION: 96 % | RESPIRATION RATE: 16 BRPM | DIASTOLIC BLOOD PRESSURE: 90 MMHG | HEIGHT: 61.5 IN | SYSTOLIC BLOOD PRESSURE: 142 MMHG | HEART RATE: 56 BPM | BODY MASS INDEX: 29.45 KG/M2 | WEIGHT: 158 LBS

## 2024-07-11 DIAGNOSIS — E78.5 HYPERLIPIDEMIA, UNSPECIFIED: ICD-10-CM

## 2024-07-11 DIAGNOSIS — G47.33 OBSTRUCTIVE SLEEP APNEA (ADULT) (PEDIATRIC): ICD-10-CM

## 2024-07-11 DIAGNOSIS — I10 ESSENTIAL (PRIMARY) HYPERTENSION: ICD-10-CM

## 2024-07-11 DIAGNOSIS — Z71.89 OTHER SPECIFIED COUNSELING: ICD-10-CM

## 2024-07-11 DIAGNOSIS — I48.0 PAROXYSMAL ATRIAL FIBRILLATION: ICD-10-CM

## 2024-07-11 DIAGNOSIS — I05.9 RHEUMATIC MITRAL VALVE DISEASE, UNSPECIFIED: ICD-10-CM

## 2024-07-11 DIAGNOSIS — R00.2 PALPITATIONS: ICD-10-CM

## 2024-07-11 PROCEDURE — 93000 ELECTROCARDIOGRAM COMPLETE: CPT

## 2024-07-11 PROCEDURE — G2211 COMPLEX E/M VISIT ADD ON: CPT

## 2024-07-11 PROCEDURE — 99214 OFFICE O/P EST MOD 30 MIN: CPT

## 2024-09-27 ENCOUNTER — APPOINTMENT (OUTPATIENT)
Dept: CARDIOLOGY | Facility: CLINIC | Age: 74
End: 2024-09-27

## 2024-09-27 PROCEDURE — 93306 TTE W/DOPPLER COMPLETE: CPT

## 2024-10-10 ENCOUNTER — NON-APPOINTMENT (OUTPATIENT)
Age: 74
End: 2024-10-10

## 2024-10-10 ENCOUNTER — APPOINTMENT (OUTPATIENT)
Dept: CARDIOLOGY | Facility: CLINIC | Age: 74
End: 2024-10-10
Payer: MEDICARE

## 2024-10-10 VITALS
BODY MASS INDEX: 28.7 KG/M2 | WEIGHT: 154 LBS | RESPIRATION RATE: 14 BRPM | SYSTOLIC BLOOD PRESSURE: 150 MMHG | DIASTOLIC BLOOD PRESSURE: 90 MMHG | HEIGHT: 61.5 IN | HEART RATE: 65 BPM

## 2024-10-10 DIAGNOSIS — I10 ESSENTIAL (PRIMARY) HYPERTENSION: ICD-10-CM

## 2024-10-10 DIAGNOSIS — I48.0 PAROXYSMAL ATRIAL FIBRILLATION: ICD-10-CM

## 2024-10-10 DIAGNOSIS — N39.0 URINARY TRACT INFECTION, SITE NOT SPECIFIED: ICD-10-CM

## 2024-10-10 DIAGNOSIS — E78.5 HYPERLIPIDEMIA, UNSPECIFIED: ICD-10-CM

## 2024-10-10 PROCEDURE — G2211 COMPLEX E/M VISIT ADD ON: CPT

## 2024-10-10 PROCEDURE — 99214 OFFICE O/P EST MOD 30 MIN: CPT

## 2024-10-10 PROCEDURE — 93000 ELECTROCARDIOGRAM COMPLETE: CPT

## 2024-10-10 RX ORDER — VALSARTAN 80 MG/1
80 TABLET, COATED ORAL
Qty: 90 | Refills: 1 | Status: ACTIVE | COMMUNITY
Start: 2024-10-10 | End: 1900-01-01

## 2024-10-10 RX ORDER — CEPHALEXIN 250 MG/1
250 CAPSULE ORAL 4 TIMES DAILY
Refills: 0 | Status: ACTIVE | COMMUNITY
Start: 2024-10-10

## 2024-10-16 ENCOUNTER — APPOINTMENT (OUTPATIENT)
Dept: PULMONOLOGY | Facility: CLINIC | Age: 74
End: 2024-10-16

## 2025-03-12 LAB — A1CG - A1C WITH ESTIMATED AVERAGE GLUCOSE: 6

## 2025-03-21 ENCOUNTER — NON-APPOINTMENT (OUTPATIENT)
Age: 75
End: 2025-03-21

## 2025-05-21 ENCOUNTER — APPOINTMENT (OUTPATIENT)
Dept: CARDIOLOGY | Facility: CLINIC | Age: 75
End: 2025-05-21
Payer: MEDICARE

## 2025-05-21 VITALS
BODY MASS INDEX: 32.8 KG/M2 | RESPIRATION RATE: 16 BRPM | HEART RATE: 70 BPM | DIASTOLIC BLOOD PRESSURE: 80 MMHG | SYSTOLIC BLOOD PRESSURE: 120 MMHG | OXYGEN SATURATION: 96 % | HEIGHT: 61.5 IN | WEIGHT: 176 LBS

## 2025-05-21 DIAGNOSIS — R00.2 PALPITATIONS: ICD-10-CM

## 2025-05-21 DIAGNOSIS — I10 ESSENTIAL (PRIMARY) HYPERTENSION: ICD-10-CM

## 2025-05-21 DIAGNOSIS — Z71.89 OTHER SPECIFIED COUNSELING: ICD-10-CM

## 2025-05-21 DIAGNOSIS — E78.5 HYPERLIPIDEMIA, UNSPECIFIED: ICD-10-CM

## 2025-05-21 DIAGNOSIS — I48.0 PAROXYSMAL ATRIAL FIBRILLATION: ICD-10-CM

## 2025-05-21 DIAGNOSIS — G47.33 OBSTRUCTIVE SLEEP APNEA (ADULT) (PEDIATRIC): ICD-10-CM

## 2025-05-21 DIAGNOSIS — I05.9 RHEUMATIC MITRAL VALVE DISEASE, UNSPECIFIED: ICD-10-CM

## 2025-05-21 PROCEDURE — 99214 OFFICE O/P EST MOD 30 MIN: CPT

## 2025-05-21 PROCEDURE — 93000 ELECTROCARDIOGRAM COMPLETE: CPT
